# Patient Record
Sex: FEMALE | Race: BLACK OR AFRICAN AMERICAN | NOT HISPANIC OR LATINO | URBAN - METROPOLITAN AREA
[De-identification: names, ages, dates, MRNs, and addresses within clinical notes are randomized per-mention and may not be internally consistent; named-entity substitution may affect disease eponyms.]

---

## 2017-01-05 ENCOUNTER — INPATIENT (INPATIENT)
Facility: HOSPITAL | Age: 77
LOS: 3 days | Discharge: HOME | DRG: 304 | End: 2017-01-09
Attending: INTERNAL MEDICINE | Admitting: INTERNAL MEDICINE
Payer: MEDICARE

## 2017-01-05 VITALS
HEIGHT: 68 IN | WEIGHT: 125 LBS | HEART RATE: 85 BPM | RESPIRATION RATE: 16 BRPM | SYSTOLIC BLOOD PRESSURE: 201 MMHG | OXYGEN SATURATION: 96 % | TEMPERATURE: 98 F | DIASTOLIC BLOOD PRESSURE: 81 MMHG

## 2017-01-05 DIAGNOSIS — D64.9 ANEMIA, UNSPECIFIED: ICD-10-CM

## 2017-01-05 DIAGNOSIS — E78.5 HYPERLIPIDEMIA, UNSPECIFIED: ICD-10-CM

## 2017-01-05 DIAGNOSIS — Z29.9 ENCOUNTER FOR PROPHYLACTIC MEASURES, UNSPECIFIED: ICD-10-CM

## 2017-01-05 DIAGNOSIS — I63.9 CEREBRAL INFARCTION, UNSPECIFIED: ICD-10-CM

## 2017-01-05 DIAGNOSIS — E11.9 TYPE 2 DIABETES MELLITUS WITHOUT COMPLICATIONS: ICD-10-CM

## 2017-01-05 DIAGNOSIS — I16.0 HYPERTENSIVE URGENCY: ICD-10-CM

## 2017-01-05 DIAGNOSIS — Z93.1 GASTROSTOMY STATUS: Chronic | ICD-10-CM

## 2017-01-05 LAB
ALBUMIN SERPL ELPH-MCNC: 3 G/DL — LOW (ref 3.5–5)
ALP SERPL-CCNC: 89 U/L — SIGNIFICANT CHANGE UP (ref 40–120)
ALT FLD-CCNC: 31 U/L DA — SIGNIFICANT CHANGE UP (ref 10–60)
ANION GAP SERPL CALC-SCNC: 6 MMOL/L — SIGNIFICANT CHANGE UP (ref 5–17)
AST SERPL-CCNC: 32 U/L — SIGNIFICANT CHANGE UP (ref 10–40)
BASOPHILS # BLD AUTO: 0.1 K/UL — SIGNIFICANT CHANGE UP (ref 0–0.2)
BASOPHILS NFR BLD AUTO: 0.9 % — SIGNIFICANT CHANGE UP (ref 0–2)
BILIRUB SERPL-MCNC: 0.2 MG/DL — SIGNIFICANT CHANGE UP (ref 0.2–1.2)
BUN SERPL-MCNC: 42 MG/DL — HIGH (ref 7–18)
CALCIUM SERPL-MCNC: 9.6 MG/DL — SIGNIFICANT CHANGE UP (ref 8.4–10.5)
CHLORIDE SERPL-SCNC: 111 MMOL/L — HIGH (ref 96–108)
CO2 SERPL-SCNC: 29 MMOL/L — SIGNIFICANT CHANGE UP (ref 22–31)
CREAT SERPL-MCNC: 1.28 MG/DL — SIGNIFICANT CHANGE UP (ref 0.5–1.3)
EOSINOPHIL # BLD AUTO: 0.1 K/UL — SIGNIFICANT CHANGE UP (ref 0–0.5)
EOSINOPHIL NFR BLD AUTO: 1.6 % — SIGNIFICANT CHANGE UP (ref 0–6)
GLUCOSE SERPL-MCNC: 95 MG/DL — SIGNIFICANT CHANGE UP (ref 70–99)
HCT VFR BLD CALC: 31.3 % — LOW (ref 34.5–45)
HGB BLD-MCNC: 9.8 G/DL — LOW (ref 11.5–15.5)
LYMPHOCYTES # BLD AUTO: 1.5 K/UL — SIGNIFICANT CHANGE UP (ref 1–3.3)
LYMPHOCYTES # BLD AUTO: 20.8 % — SIGNIFICANT CHANGE UP (ref 13–44)
MCHC RBC-ENTMCNC: 23.6 PG — LOW (ref 27–34)
MCHC RBC-ENTMCNC: 31.4 GM/DL — LOW (ref 32–36)
MCV RBC AUTO: 75.2 FL — LOW (ref 80–100)
MONOCYTES # BLD AUTO: 0.6 K/UL — SIGNIFICANT CHANGE UP (ref 0–0.9)
MONOCYTES NFR BLD AUTO: 8.4 % — SIGNIFICANT CHANGE UP (ref 2–14)
NEUTROPHILS # BLD AUTO: 4.9 K/UL — SIGNIFICANT CHANGE UP (ref 1.8–7.4)
NEUTROPHILS NFR BLD AUTO: 68.3 % — SIGNIFICANT CHANGE UP (ref 43–77)
PLATELET # BLD AUTO: 327 K/UL — SIGNIFICANT CHANGE UP (ref 150–400)
POTASSIUM SERPL-MCNC: 4.2 MMOL/L — SIGNIFICANT CHANGE UP (ref 3.5–5.3)
POTASSIUM SERPL-SCNC: 4.2 MMOL/L — SIGNIFICANT CHANGE UP (ref 3.5–5.3)
PROT SERPL-MCNC: 7.4 G/DL — SIGNIFICANT CHANGE UP (ref 6–8.3)
RBC # BLD: 4.17 M/UL — SIGNIFICANT CHANGE UP (ref 3.8–5.2)
RBC # FLD: 20 % — HIGH (ref 10.3–14.5)
SODIUM SERPL-SCNC: 146 MMOL/L — HIGH (ref 135–145)
WBC # BLD: 7.1 K/UL — SIGNIFICANT CHANGE UP (ref 3.8–10.5)
WBC # FLD AUTO: 7.1 K/UL — SIGNIFICANT CHANGE UP (ref 3.8–10.5)

## 2017-01-05 PROCEDURE — 99285 EMERGENCY DEPT VISIT HI MDM: CPT

## 2017-01-05 RX ORDER — DEXTROSE 50 % IN WATER 50 %
25 SYRINGE (ML) INTRAVENOUS ONCE
Qty: 0 | Refills: 0 | Status: DISCONTINUED | OUTPATIENT
Start: 2017-01-05 | End: 2017-01-09

## 2017-01-05 RX ORDER — BACLOFEN 100 %
10 POWDER (GRAM) MISCELLANEOUS THREE TIMES A DAY
Qty: 0 | Refills: 0 | Status: DISCONTINUED | OUTPATIENT
Start: 2017-01-05 | End: 2017-01-09

## 2017-01-05 RX ORDER — CLOPIDOGREL BISULFATE 75 MG/1
75 TABLET, FILM COATED ORAL DAILY
Qty: 0 | Refills: 0 | Status: DISCONTINUED | OUTPATIENT
Start: 2017-01-05 | End: 2017-01-09

## 2017-01-05 RX ORDER — INSULIN LISPRO 100/ML
VIAL (ML) SUBCUTANEOUS
Qty: 0 | Refills: 0 | Status: DISCONTINUED | OUTPATIENT
Start: 2017-01-05 | End: 2017-01-09

## 2017-01-05 RX ORDER — HYDRALAZINE HCL 50 MG
10 TABLET ORAL ONCE
Qty: 0 | Refills: 0 | Status: COMPLETED | OUTPATIENT
Start: 2017-01-05 | End: 2017-01-05

## 2017-01-05 RX ORDER — ATORVASTATIN CALCIUM 80 MG/1
1 TABLET, FILM COATED ORAL
Qty: 0 | Refills: 0 | COMMUNITY

## 2017-01-05 RX ORDER — LISINOPRIL 2.5 MG/1
20 TABLET ORAL DAILY
Qty: 0 | Refills: 0 | Status: DISCONTINUED | OUTPATIENT
Start: 2017-01-05 | End: 2017-01-06

## 2017-01-05 RX ORDER — METOPROLOL TARTRATE 50 MG
100 TABLET ORAL
Qty: 0 | Refills: 0 | Status: DISCONTINUED | OUTPATIENT
Start: 2017-01-05 | End: 2017-01-09

## 2017-01-05 RX ORDER — SODIUM CHLORIDE 9 MG/ML
1000 INJECTION, SOLUTION INTRAVENOUS
Qty: 0 | Refills: 0 | Status: DISCONTINUED | OUTPATIENT
Start: 2017-01-05 | End: 2017-01-09

## 2017-01-05 RX ORDER — GLUCAGON INJECTION, SOLUTION 0.5 MG/.1ML
1 INJECTION, SOLUTION SUBCUTANEOUS ONCE
Qty: 0 | Refills: 0 | Status: DISCONTINUED | OUTPATIENT
Start: 2017-01-05 | End: 2017-01-09

## 2017-01-05 RX ORDER — DEXTROSE 50 % IN WATER 50 %
1 SYRINGE (ML) INTRAVENOUS ONCE
Qty: 0 | Refills: 0 | Status: DISCONTINUED | OUTPATIENT
Start: 2017-01-05 | End: 2017-01-09

## 2017-01-05 RX ORDER — DEXTROSE 50 % IN WATER 50 %
12.5 SYRINGE (ML) INTRAVENOUS ONCE
Qty: 0 | Refills: 0 | Status: DISCONTINUED | OUTPATIENT
Start: 2017-01-05 | End: 2017-01-09

## 2017-01-05 RX ORDER — ATORVASTATIN CALCIUM 80 MG/1
80 TABLET, FILM COATED ORAL AT BEDTIME
Qty: 0 | Refills: 0 | Status: DISCONTINUED | OUTPATIENT
Start: 2017-01-05 | End: 2017-01-09

## 2017-01-05 NOTE — H&P ADULT. - PROBLEM SELECTOR PLAN 4
c/w atorvastatin 80mg a day   f/u lipid profile pt takes metformin 1000mg BID and Glimepiride 2mg a day  c/w HSS for now   f/u HbA1C

## 2017-01-05 NOTE — H&P ADULT. - PROBLEM SELECTOR PROBLEM 4
Hyperlipidemia, unspecified hyperlipidemia type Type 2 diabetes mellitus without complication, without long-term current use of insulin

## 2017-01-05 NOTE — H&P ADULT. - PROBLEM SELECTOR PLAN 2
pt has hx of CVA with R residual hemiparesis   pt is bed bound and non verbal since   c/w ASA, statin pt has hx of CVA with R residual hemiparesis   pt is bed bound and non verbal since   c/w Plavix, statin

## 2017-01-05 NOTE — ED PROVIDER NOTE - OBJECTIVE STATEMENT
Pt was living in [place] with her daughter and suffered major CVA, went to rehab and was d/c from rehab yesterday. Family decided to move her to [facility] for better family support with multiple sons/daughters. Children picked her up from airport today and patient had no supplies, and no home care set up, although family was told that it had been. They were instructed to go to ED. Per family there have been no acute changes, but they have no way to provide care for their mother at home, no food, feeding pump, home services, training, etc.

## 2017-01-05 NOTE — ED ADULT NURSE NOTE - OBJECTIVE STATEMENT
pt a&ox3, BIBEMS to check feeding tube. pt with CVA in june 2016, right sided weakness. pt a&ox3, BIBEMS to check feeding tube. pt with CVA in june 2016, right sided weakness. pt with PEG tube placement mid december 2016. lived in [place] with daughter, pt moved to [facility] to live with children but has no supplies for peg tube feedings and no home care set up. seen by SW, to be admitted.

## 2017-01-05 NOTE — H&P ADULT. - PROBLEM SELECTOR PLAN 1
On admission pt presented with hypertensive urgency of 201/81  pt did not get medications in AM  will resume medications   c/w telemetry monitoring for 24hrs until BP improves   f/u TSH, lipid profile and HbA1C

## 2017-01-05 NOTE — ED ADULT NURSE NOTE - ED STAT RN HANDOFF DETAILS
report given to LORENZO Alonzo in G2 in stable condition for continuation of care. pt a&ox3, unable to speak. hx of CVA right sided weakness. 20G LAC. PEG tube.

## 2017-01-05 NOTE — H&P ADULT. - ATTENDING COMMENTS
Patient seen and examined after d/w LÓPEZ Vera this morning    75 y/o F s/p Acute CVA, Rehab stay and bedbound, non verbal s/p Peg brought in by family from [facility] to be cared for by other family members, family unable to care for ADL's and feeding with planned Home arrangements not set up brought to hospital. Patient in no acute distress, contracted unable to convey any needs or complaints. Patient with elevated BP on admission, still elevated but better controlled.    SH: Non smoker  FH: Not contributory to current presentation.    Vitals: /80 to 176/84  HR 78  Temp 98.2F SaO2 100%  RR 15    P/E: As above  Neuro: Alert, Awake, Non verbal, Right sided hemiparesis, Able to move left arm.   Legs: Contracted; No leg edema b/l LE    Labs: reviewed;   CXR:     D/D:   Inability to care for at home  Uncontrolled HTn  Anemia etiology unclear  s/p CVA with residual hemiparesis    Plan: Admit to tele overnight; D/C tele if BP better controlled;   Continue Metoprolol; Increased Hydralazine to 50mg q 8 hrs. Continue Plavix.  iron studies noted; No evidence of hemolysis; F/U B12, Vitamin D levels    Discussed with PGY2 LÓPEZ Vera this morning;   Discussed with  who spoke with daughter; Patient is pending Medicaid; Plan as per daughter, okay with Short term rehab if any ability to participate in therapy (doubt), if not okay with Skilled Nursing facility placement.   Discussed with floor PGY1 Dr. Larry. Patient seen and examined after d/w LÓPEZ Vera this morning    75 y/o F s/p Acute CVA, Rehab stay and bedbound, non verbal s/p Peg brought in by family from [facility] to be cared for by other family members, family unable to care for ADL's and feeding with planned Home arrangements not set up brought to hospital. Patient in no acute distress, contracted unable to convey any needs or complaints. Patient with elevated BP on admission, still elevated but better controlled.    SH: Non smoker  FH: Not contributory to current presentation.    Vitals: /80 to 176/84  HR 78  Temp 98.2F SaO2 100%  RR 15    P/E: As above  Neuro: Alert, Awake, Non verbal, Right sided hemiparesis, Able to move left arm.   Legs: Contracted; No leg edema b/l LE    Labs: reviewed;   CXR:     D/D:   Inability to care for at home  Uncontrolled HTn  Anemia etiology unclear  s/p CVA with residual hemiparesis    Plan: Admit to tele overnight; D/C tele if BP better controlled;   Continue Metoprolol; Increased Hydralazine to 50mg q 8 hrs. Increase Lisinopril to 40 mg daily; 20mg now;  Continue Plavix.  iron studies noted; No evidence of hemolysis; F/U B12, Vitamin D levels    Discussed with PGY2 LÓPEZ Vera this morning;   Discussed with  who spoke with daughter; Patient is pending Medicaid; Plan as per daughter, okay with Short term rehab if any ability to participate in therapy (doubt), if not okay with Skilled Nursing facility placement.   Discussed with floor PGY1 Dr. Larry.    Attending Addendum: 5PM  Discussed with son at bedside who brought in records from [facility] stay. reviewed meds; Patient had CVA in June 2016 discharged to rehab on Puree diet, developed dysphagia in December when peg tube was placed. Patient seen and examined after d/w LÓPEZ Vera this morning    77 y/o F s/p Acute CVA, Rehab stay and bedbound, non verbal s/p Peg brought in by family from [facility] to be cared for by other family members, family unable to care for ADL's and feeding with planned Home arrangements not set up brought to hospital. Patient in no acute distress, contracted unable to convey any needs or complaints. Patient with elevated BP on admission, still elevated but better controlled.    SH: Non smoker  FH: Not contributory to current presentation.    Vitals: /80 to 176/84  HR 78  Temp 98.2F SaO2 100%  RR 15    P/E: As above  Neuro: Alert, Awake, Non verbal, Right sided hemiparesis, Able to move left arm.   Legs: Contracted; No leg edema b/l LE    Labs: reviewed;   CXR:     D/D:   Inability to care for at home  Uncontrolled HTn  Anemia etiology unclear  s/p CVA with residual hemiparesis    Plan: Admit to tele overnight; D/C tele if BP better controlled;   Continue Metoprolol; Increased Hydralazine to 50mg q 8 hrs. Increase Lisinopril to 40 mg daily; 20mg now;  Continue Plavix.  iron studies noted; No evidence of hemolysis; F/U B12, Vitamin D levels    Discussed with PGY2 LÓPEZ Vera this morning;   Discussed with  who spoke with daughter; Patient is pending Medicaid; Plan as per daughter, okay with Short term rehab if any ability to participate in therapy (doubt), if not okay with Skilled Nursing facility placement.   Discussed with floor PGY1 Dr. Larry.    Attending Addendum: 5PM  Discussed with son at bedside who brought in records from [facility] stay. reviewed meds; Patient had CVA in June 2016 discharged to rehab on Puree diet, developed dysphagia in December when peg tube was placed.    Patient was om Metformin and Glimepiride. Will resume Metformin once sugars are more than 150 and tolerating diet.If still elevated, will place back on Glimepiride.

## 2017-01-05 NOTE — H&P ADULT. - HISTORY OF PRESENT ILLNESS
77 y/o F originally from [place] and bedbound and non verbal since. Pt has PMH of HTN, DM, HLD and CVA (with residual Right hemiparesis). Pt had recent CVA in [place] has discharged to rehab with feeding tube (due to decreased oral intake) and was discharged from rehab yesterday. Family decided to move her to [facility] for better family support with multiple sons/daughters. Children picked her up from airport today and patient had no supplies, and no home care set up, although family was told that it had been. They were instructed to go to ED. Per family there have been no acute changes, but they have no way to provide care for their mother at home, no food, feeding pump, home services, training, etc. Unable to asses ROS due to pt non verbal.    Social Hx: never smoker,

## 2017-01-05 NOTE — H&P ADULT. - GASTROINTESTINAL DETAILS
no organomegaly/no masses palpable/no bruit/soft/no rebound tenderness/nontender/normal/no distention/no rigidity/bowel sounds normal/no guarding

## 2017-01-05 NOTE — H&P ADULT. - PROBLEM SELECTOR PROBLEM 3
Type 2 diabetes mellitus without complication, without long-term current use of insulin Anemia, unspecified type

## 2017-01-05 NOTE — H&P ADULT. - ASSESSMENT
75 y/o F originally from [place] and bedbound and non verbal since. Pt has PMH of HTN, DM, HLD and CVA (with residual Right hemiparesis). Pt had recent CVA in [place] has discharged to rehab with feeding tube (due to decreased oral intake) and was discharged from rehab yesterday. Family decided to move her to [facility] for better family support with multiple sons/daughters, but unable to care of her at home due to on supplies at home.

## 2017-01-05 NOTE — H&P ADULT. - PROBLEM SELECTOR PLAN 3
pt takes metformin 1000mg BID and Glimepiride 2mg a day  c/w HSS for now   f/u HbA1C pt presented with microcytic anemia, possible 2/2 chronic disease   no evidence of blood in the stool   f/u anemia panel  f/u control CBC in AM

## 2017-01-05 NOTE — H&P ADULT. - PMH
CVA (cerebral vascular accident)    Essential hypertension    Hyperlipidemia, unspecified hyperlipidemia type    Type 2 diabetes mellitus without complication, without long-term current use of insulin

## 2017-01-06 LAB
ALBUMIN SERPL ELPH-MCNC: 3.1 G/DL — LOW (ref 3.5–5)
ALP SERPL-CCNC: 85 U/L — SIGNIFICANT CHANGE UP (ref 40–120)
ALT FLD-CCNC: 29 U/L DA — SIGNIFICANT CHANGE UP (ref 10–60)
ANION GAP SERPL CALC-SCNC: 7 MMOL/L — SIGNIFICANT CHANGE UP (ref 5–17)
AST SERPL-CCNC: 28 U/L — SIGNIFICANT CHANGE UP (ref 10–40)
BASOPHILS # BLD AUTO: 0 K/UL — SIGNIFICANT CHANGE UP (ref 0–0.2)
BASOPHILS NFR BLD AUTO: 0.5 % — SIGNIFICANT CHANGE UP (ref 0–2)
BILIRUB SERPL-MCNC: 0.4 MG/DL — SIGNIFICANT CHANGE UP (ref 0.2–1.2)
BUN SERPL-MCNC: 39 MG/DL — HIGH (ref 7–18)
CALCIUM SERPL-MCNC: 9.6 MG/DL — SIGNIFICANT CHANGE UP (ref 8.4–10.5)
CHLORIDE SERPL-SCNC: 110 MMOL/L — HIGH (ref 96–108)
CK MB BLD-MCNC: <1.6 % — SIGNIFICANT CHANGE UP (ref 0–3.5)
CK MB BLD-MCNC: <1.9 % — SIGNIFICANT CHANGE UP (ref 0–3.5)
CK MB CFR SERPL CALC: <1 NG/ML — SIGNIFICANT CHANGE UP (ref 0–3.6)
CK MB CFR SERPL CALC: <1 NG/ML — SIGNIFICANT CHANGE UP (ref 0–3.6)
CK SERPL-CCNC: 52 U/L — SIGNIFICANT CHANGE UP (ref 21–215)
CK SERPL-CCNC: 64 U/L — SIGNIFICANT CHANGE UP (ref 21–215)
CO2 SERPL-SCNC: 29 MMOL/L — SIGNIFICANT CHANGE UP (ref 22–31)
CREAT SERPL-MCNC: 1.25 MG/DL — SIGNIFICANT CHANGE UP (ref 0.5–1.3)
EOSINOPHIL # BLD AUTO: 0.1 K/UL — SIGNIFICANT CHANGE UP (ref 0–0.5)
EOSINOPHIL NFR BLD AUTO: 1.9 % — SIGNIFICANT CHANGE UP (ref 0–6)
FERRITIN SERPL-MCNC: 413.7 NG/ML — HIGH (ref 15–150)
FOLATE SERPL-MCNC: 19.5 NG/ML — SIGNIFICANT CHANGE UP (ref 4.8–24.2)
GLUCOSE SERPL-MCNC: 113 MG/DL — HIGH (ref 70–99)
HAPTOGLOB SERPL-MCNC: 370 MG/DL — HIGH (ref 34–200)
HBA1C BLD-MCNC: 5.6 % — SIGNIFICANT CHANGE UP (ref 4–5.6)
HCT VFR BLD CALC: 29.9 % — LOW (ref 34.5–45)
HGB BLD-MCNC: 9.2 G/DL — LOW (ref 11.5–15.5)
IRON SATN MFR SERPL: 27 % — SIGNIFICANT CHANGE UP (ref 15–50)
IRON SATN MFR SERPL: 61 UG/DL — SIGNIFICANT CHANGE UP (ref 40–150)
LDH SERPL L TO P-CCNC: 270 U/L — HIGH (ref 120–225)
LYMPHOCYTES # BLD AUTO: 1.4 K/UL — SIGNIFICANT CHANGE UP (ref 1–3.3)
LYMPHOCYTES # BLD AUTO: 19.1 % — SIGNIFICANT CHANGE UP (ref 13–44)
MAGNESIUM SERPL-MCNC: 2.1 MG/DL — SIGNIFICANT CHANGE UP (ref 1.8–2.4)
MCHC RBC-ENTMCNC: 23.4 PG — LOW (ref 27–34)
MCHC RBC-ENTMCNC: 30.6 GM/DL — LOW (ref 32–36)
MCV RBC AUTO: 76.3 FL — LOW (ref 80–100)
MONOCYTES # BLD AUTO: 0.6 K/UL — SIGNIFICANT CHANGE UP (ref 0–0.9)
MONOCYTES NFR BLD AUTO: 7.4 % — SIGNIFICANT CHANGE UP (ref 2–14)
NEUTROPHILS # BLD AUTO: 5.4 K/UL — SIGNIFICANT CHANGE UP (ref 1.8–7.4)
NEUTROPHILS NFR BLD AUTO: 71.2 % — SIGNIFICANT CHANGE UP (ref 43–77)
PHOSPHATE SERPL-MCNC: 1.2 MG/DL — LOW (ref 2.5–4.5)
PLATELET # BLD AUTO: 323 K/UL — SIGNIFICANT CHANGE UP (ref 150–400)
POTASSIUM SERPL-MCNC: 3.8 MMOL/L — SIGNIFICANT CHANGE UP (ref 3.5–5.3)
POTASSIUM SERPL-SCNC: 3.8 MMOL/L — SIGNIFICANT CHANGE UP (ref 3.5–5.3)
PROT SERPL-MCNC: 7.2 G/DL — SIGNIFICANT CHANGE UP (ref 6–8.3)
RBC # BLD: 3.91 M/UL — SIGNIFICANT CHANGE UP (ref 3.8–5.2)
RBC # BLD: 3.92 M/UL — SIGNIFICANT CHANGE UP (ref 3.8–5.2)
RBC # FLD: 19.9 % — HIGH (ref 10.3–14.5)
RETICS #: 127.5 K/UL — HIGH (ref 25–125)
RETICS/RBC NFR: 3.3 % — HIGH (ref 0.5–2.5)
SODIUM SERPL-SCNC: 146 MMOL/L — HIGH (ref 135–145)
TIBC SERPL-MCNC: 225 UG/DL — LOW (ref 250–450)
TROPONIN I SERPL-MCNC: 0.02 NG/ML — SIGNIFICANT CHANGE UP (ref 0–0.04)
TROPONIN I SERPL-MCNC: 0.05 NG/ML — HIGH (ref 0–0.04)
UIBC SERPL-MCNC: 164 UG/DL — SIGNIFICANT CHANGE UP (ref 110–370)
VIT B12 SERPL-MCNC: 475 PG/ML — SIGNIFICANT CHANGE UP (ref 243–894)
WBC # BLD: 7.6 K/UL — SIGNIFICANT CHANGE UP (ref 3.8–10.5)
WBC # FLD AUTO: 7.6 K/UL — SIGNIFICANT CHANGE UP (ref 3.8–10.5)

## 2017-01-06 PROCEDURE — 99223 1ST HOSP IP/OBS HIGH 75: CPT | Mod: AI,GC

## 2017-01-06 RX ORDER — SODIUM CHLORIDE 9 MG/ML
1000 INJECTION, SOLUTION INTRAVENOUS
Qty: 0 | Refills: 0 | Status: DISCONTINUED | OUTPATIENT
Start: 2017-01-06 | End: 2017-01-06

## 2017-01-06 RX ORDER — POTASSIUM PHOSPHATE, MONOBASIC POTASSIUM PHOSPHATE, DIBASIC 236; 224 MG/ML; MG/ML
15 INJECTION, SOLUTION INTRAVENOUS ONCE
Qty: 0 | Refills: 0 | Status: COMPLETED | OUTPATIENT
Start: 2017-01-06 | End: 2017-01-06

## 2017-01-06 RX ORDER — HYDRALAZINE HCL 50 MG
25 TABLET ORAL ONCE
Qty: 0 | Refills: 0 | Status: COMPLETED | OUTPATIENT
Start: 2017-01-06 | End: 2017-01-06

## 2017-01-06 RX ORDER — HYDRALAZINE HCL 50 MG
50 TABLET ORAL EVERY 8 HOURS
Qty: 0 | Refills: 0 | Status: DISCONTINUED | OUTPATIENT
Start: 2017-01-06 | End: 2017-01-09

## 2017-01-06 RX ORDER — LISINOPRIL 2.5 MG/1
20 TABLET ORAL ONCE
Qty: 0 | Refills: 0 | Status: COMPLETED | OUTPATIENT
Start: 2017-01-06 | End: 2017-01-06

## 2017-01-06 RX ORDER — LISINOPRIL 2.5 MG/1
40 TABLET ORAL DAILY
Qty: 0 | Refills: 0 | Status: DISCONTINUED | OUTPATIENT
Start: 2017-01-07 | End: 2017-01-09

## 2017-01-06 RX ORDER — ENOXAPARIN SODIUM 100 MG/ML
40 INJECTION SUBCUTANEOUS DAILY
Qty: 0 | Refills: 0 | Status: DISCONTINUED | OUTPATIENT
Start: 2017-01-06 | End: 2017-01-09

## 2017-01-06 RX ADMIN — SODIUM CHLORIDE 60 MILLILITER(S): 9 INJECTION, SOLUTION INTRAVENOUS at 06:44

## 2017-01-06 RX ADMIN — SODIUM CHLORIDE 60 MILLILITER(S): 9 INJECTION, SOLUTION INTRAVENOUS at 03:59

## 2017-01-06 RX ADMIN — POTASSIUM PHOSPHATE, MONOBASIC POTASSIUM PHOSPHATE, DIBASIC 62.5 MILLIMOLE(S): 236; 224 INJECTION, SOLUTION INTRAVENOUS at 11:50

## 2017-01-06 RX ADMIN — CLOPIDOGREL BISULFATE 75 MILLIGRAM(S): 75 TABLET, FILM COATED ORAL at 11:50

## 2017-01-06 RX ADMIN — Medication 10 MILLIGRAM(S): at 00:02

## 2017-01-06 RX ADMIN — LISINOPRIL 20 MILLIGRAM(S): 2.5 TABLET ORAL at 18:07

## 2017-01-06 RX ADMIN — Medication 25 MILLIGRAM(S): at 10:32

## 2017-01-06 RX ADMIN — ATORVASTATIN CALCIUM 80 MILLIGRAM(S): 80 TABLET, FILM COATED ORAL at 23:01

## 2017-01-06 RX ADMIN — Medication 50 MILLIGRAM(S): at 23:01

## 2017-01-06 RX ADMIN — Medication 50 MILLIGRAM(S): at 18:06

## 2017-01-06 RX ADMIN — Medication 100 MILLIGRAM(S): at 00:02

## 2017-01-06 RX ADMIN — Medication 100 MILLIGRAM(S): at 18:07

## 2017-01-06 RX ADMIN — Medication 100 MILLIGRAM(S): at 06:39

## 2017-01-06 RX ADMIN — LISINOPRIL 20 MILLIGRAM(S): 2.5 TABLET ORAL at 06:39

## 2017-01-06 NOTE — DIETITIAN INITIAL EVALUATION ADULT. - MD RECOMMEND
Increase glucerna 1.2 to new goal rate of 75ml/hx16(1200ml,1440kcal,72g protein, 966ml free water), MD to monitor/assess fluid status as needed.

## 2017-01-06 NOTE — DIETITIAN INITIAL EVALUATION ADULT. - DIET TYPE
NPO with tube feedings/Glucerna 1.2 at 20x16, goal 30x16(480ml,576kcal,29g protein, 386ml free water)

## 2017-01-06 NOTE — DIETITIAN INITIAL EVALUATION ADULT. - PROBLEM SELECTOR PLAN 2
pt has hx of CVA with R residual hemiparesis   pt is bed bound and non verbal since   c/w Plavix, statin

## 2017-01-06 NOTE — DIETITIAN INITIAL EVALUATION ADULT. - PROBLEM SELECTOR PLAN 3
pt presented with microcytic anemia, possible 2/2 chronic disease   no evidence of blood in the stool   f/u anemia panel  f/u control CBC in AM

## 2017-01-07 LAB
24R-OH-CALCIDIOL SERPL-MCNC: 19.9 NG/ML — LOW (ref 30–100)
ALBUMIN SERPL ELPH-MCNC: 3 G/DL — LOW (ref 3.5–5)
ALP SERPL-CCNC: 87 U/L — SIGNIFICANT CHANGE UP (ref 40–120)
ALT FLD-CCNC: 31 U/L DA — SIGNIFICANT CHANGE UP (ref 10–60)
ANION GAP SERPL CALC-SCNC: 9 MMOL/L — SIGNIFICANT CHANGE UP (ref 5–17)
AST SERPL-CCNC: 26 U/L — SIGNIFICANT CHANGE UP (ref 10–40)
BASOPHILS # BLD AUTO: 0.1 K/UL — SIGNIFICANT CHANGE UP (ref 0–0.2)
BASOPHILS NFR BLD AUTO: 0.9 % — SIGNIFICANT CHANGE UP (ref 0–2)
BILIRUB SERPL-MCNC: 0.5 MG/DL — SIGNIFICANT CHANGE UP (ref 0.2–1.2)
BUN SERPL-MCNC: 30 MG/DL — HIGH (ref 7–18)
CALCIUM SERPL-MCNC: 9.1 MG/DL — SIGNIFICANT CHANGE UP (ref 8.4–10.5)
CHLORIDE SERPL-SCNC: 108 MMOL/L — SIGNIFICANT CHANGE UP (ref 96–108)
CO2 SERPL-SCNC: 27 MMOL/L — SIGNIFICANT CHANGE UP (ref 22–31)
CREAT SERPL-MCNC: 1.24 MG/DL — SIGNIFICANT CHANGE UP (ref 0.5–1.3)
EOSINOPHIL # BLD AUTO: 0.1 K/UL — SIGNIFICANT CHANGE UP (ref 0–0.5)
EOSINOPHIL NFR BLD AUTO: 1.7 % — SIGNIFICANT CHANGE UP (ref 0–6)
FOLATE SERPL-MCNC: 16.1 NG/ML — SIGNIFICANT CHANGE UP (ref 4.8–24.2)
GLUCOSE SERPL-MCNC: 98 MG/DL — SIGNIFICANT CHANGE UP (ref 70–99)
HBA1C BLD-MCNC: 5.7 % — HIGH (ref 4–5.6)
HCT VFR BLD CALC: 29.7 % — LOW (ref 34.5–45)
HGB BLD-MCNC: 9 G/DL — LOW (ref 11.5–15.5)
LYMPHOCYTES # BLD AUTO: 1.9 K/UL — SIGNIFICANT CHANGE UP (ref 1–3.3)
LYMPHOCYTES # BLD AUTO: 29 % — SIGNIFICANT CHANGE UP (ref 13–44)
MAGNESIUM SERPL-MCNC: 1.9 MG/DL — SIGNIFICANT CHANGE UP (ref 1.8–2.4)
MCHC RBC-ENTMCNC: 22.8 PG — LOW (ref 27–34)
MCHC RBC-ENTMCNC: 30.4 GM/DL — LOW (ref 32–36)
MCV RBC AUTO: 74.9 FL — LOW (ref 80–100)
MONOCYTES # BLD AUTO: 0.5 K/UL — SIGNIFICANT CHANGE UP (ref 0–0.9)
MONOCYTES NFR BLD AUTO: 7.8 % — SIGNIFICANT CHANGE UP (ref 2–14)
NEUTROPHILS # BLD AUTO: 4 K/UL — SIGNIFICANT CHANGE UP (ref 1.8–7.4)
NEUTROPHILS NFR BLD AUTO: 60.6 % — SIGNIFICANT CHANGE UP (ref 43–77)
PHOSPHATE SERPL-MCNC: 2.6 MG/DL — SIGNIFICANT CHANGE UP (ref 2.5–4.5)
PLATELET # BLD AUTO: 308 K/UL — SIGNIFICANT CHANGE UP (ref 150–400)
POTASSIUM SERPL-MCNC: 3.5 MMOL/L — SIGNIFICANT CHANGE UP (ref 3.5–5.3)
POTASSIUM SERPL-SCNC: 3.5 MMOL/L — SIGNIFICANT CHANGE UP (ref 3.5–5.3)
PROT SERPL-MCNC: 7 G/DL — SIGNIFICANT CHANGE UP (ref 6–8.3)
RBC # BLD: 3.96 M/UL — SIGNIFICANT CHANGE UP (ref 3.8–5.2)
RBC # FLD: 19 % — HIGH (ref 10.3–14.5)
SODIUM SERPL-SCNC: 144 MMOL/L — SIGNIFICANT CHANGE UP (ref 135–145)
TSH SERPL-MCNC: 1.64 UU/ML — SIGNIFICANT CHANGE UP (ref 0.34–4.82)
VIT D25+D1,25 OH+D1,25 PNL SERPL-MCNC: 37.3 PG/ML — SIGNIFICANT CHANGE UP (ref 19.9–79.3)
WBC # BLD: 6.6 K/UL — SIGNIFICANT CHANGE UP (ref 3.8–10.5)
WBC # FLD AUTO: 6.6 K/UL — SIGNIFICANT CHANGE UP (ref 3.8–10.5)

## 2017-01-07 PROCEDURE — 99232 SBSQ HOSP IP/OBS MODERATE 35: CPT

## 2017-01-07 RX ORDER — IRON SUCROSE 20 MG/ML
100 INJECTION, SOLUTION INTRAVENOUS DAILY
Qty: 0 | Refills: 0 | Status: DISCONTINUED | OUTPATIENT
Start: 2017-01-07 | End: 2017-01-09

## 2017-01-07 RX ADMIN — Medication 50 MILLIGRAM(S): at 06:12

## 2017-01-07 RX ADMIN — IRON SUCROSE 210 MILLIGRAM(S): 20 INJECTION, SOLUTION INTRAVENOUS at 18:28

## 2017-01-07 RX ADMIN — ENOXAPARIN SODIUM 40 MILLIGRAM(S): 100 INJECTION SUBCUTANEOUS at 12:21

## 2017-01-07 RX ADMIN — ATORVASTATIN CALCIUM 80 MILLIGRAM(S): 80 TABLET, FILM COATED ORAL at 22:40

## 2017-01-07 RX ADMIN — Medication 100 MILLIGRAM(S): at 06:11

## 2017-01-07 RX ADMIN — Medication 100 MILLIGRAM(S): at 18:28

## 2017-01-07 RX ADMIN — Medication 1: at 18:28

## 2017-01-07 RX ADMIN — Medication 50 MILLIGRAM(S): at 22:40

## 2017-01-07 RX ADMIN — Medication 50 MILLIGRAM(S): at 13:29

## 2017-01-07 RX ADMIN — LISINOPRIL 40 MILLIGRAM(S): 2.5 TABLET ORAL at 06:12

## 2017-01-07 RX ADMIN — CLOPIDOGREL BISULFATE 75 MILLIGRAM(S): 75 TABLET, FILM COATED ORAL at 12:21

## 2017-01-08 PROCEDURE — 99232 SBSQ HOSP IP/OBS MODERATE 35: CPT | Mod: GC

## 2017-01-08 RX ORDER — DOCUSATE SODIUM 100 MG
100 CAPSULE ORAL
Qty: 0 | Refills: 0 | Status: DISCONTINUED | OUTPATIENT
Start: 2017-01-08 | End: 2017-01-09

## 2017-01-08 RX ORDER — SENNA PLUS 8.6 MG/1
2 TABLET ORAL AT BEDTIME
Qty: 0 | Refills: 0 | Status: DISCONTINUED | OUTPATIENT
Start: 2017-01-08 | End: 2017-01-09

## 2017-01-08 RX ORDER — CHOLECALCIFEROL (VITAMIN D3) 125 MCG
1000 CAPSULE ORAL DAILY
Qty: 0 | Refills: 0 | Status: DISCONTINUED | OUTPATIENT
Start: 2017-01-08 | End: 2017-01-09

## 2017-01-08 RX ORDER — CHOLECALCIFEROL (VITAMIN D3) 125 MCG
1000 CAPSULE ORAL DAILY
Qty: 0 | Refills: 0 | Status: DISCONTINUED | OUTPATIENT
Start: 2017-01-08 | End: 2017-01-08

## 2017-01-08 RX ORDER — DOCUSATE SODIUM 100 MG
100 CAPSULE ORAL
Qty: 0 | Refills: 0 | Status: DISCONTINUED | OUTPATIENT
Start: 2017-01-08 | End: 2017-01-08

## 2017-01-08 RX ADMIN — Medication 1000 UNIT(S): at 12:55

## 2017-01-08 RX ADMIN — ENOXAPARIN SODIUM 40 MILLIGRAM(S): 100 INJECTION SUBCUTANEOUS at 12:55

## 2017-01-08 RX ADMIN — Medication 100 MILLIGRAM(S): at 18:18

## 2017-01-08 RX ADMIN — Medication 50 MILLIGRAM(S): at 18:17

## 2017-01-08 RX ADMIN — Medication 1000 UNIT(S): at 22:16

## 2017-01-08 RX ADMIN — Medication 50 MILLIGRAM(S): at 05:57

## 2017-01-08 RX ADMIN — ATORVASTATIN CALCIUM 80 MILLIGRAM(S): 80 TABLET, FILM COATED ORAL at 22:16

## 2017-01-08 RX ADMIN — Medication 1: at 18:17

## 2017-01-08 RX ADMIN — Medication 50 MILLIGRAM(S): at 22:16

## 2017-01-08 RX ADMIN — CLOPIDOGREL BISULFATE 75 MILLIGRAM(S): 75 TABLET, FILM COATED ORAL at 12:55

## 2017-01-08 RX ADMIN — Medication 100 MILLIGRAM(S): at 05:57

## 2017-01-08 RX ADMIN — SENNA PLUS 2 TABLET(S): 8.6 TABLET ORAL at 22:16

## 2017-01-08 RX ADMIN — LISINOPRIL 40 MILLIGRAM(S): 2.5 TABLET ORAL at 05:57

## 2017-01-08 RX ADMIN — IRON SUCROSE 210 MILLIGRAM(S): 20 INJECTION, SOLUTION INTRAVENOUS at 12:55

## 2017-01-08 NOTE — PHYSICAL THERAPY INITIAL EVALUATION ADULT - PLANNED THERAPY INTERVENTIONS, PT EVAL
balance training/stretching/strengthening/bed mobility training/gait training/ROM/neuromuscular re-education/transfer training

## 2017-01-08 NOTE — PHYSICAL THERAPY INITIAL EVALUATION ADULT - DIAGNOSIS, PT EVAL
Difficulty in walking secondary to decreased strength, decreased ROM, decreased balance and decreased endurance.

## 2017-01-08 NOTE — DISCHARGE NOTE ADULT - MEDICATION SUMMARY - MEDICATIONS TO TAKE
I will START or STAY ON the medications listed below when I get home from the hospital:    lisinopril 40 mg oral tablet  -- 1 tab(s) by mouth once a day  -- Indication: For Hypertensive urgency    metFORMIN 1000 mg oral tablet  -- 1 tab(s) by mouth 2 times a day  -- Indication: For Type 2 diabetes mellitus without complication, without long-term current use of insulin    glimepiride 2 mg oral tablet  -- 1 tab(s) by mouth once a day  -- Indication: For Type 2 diabetes mellitus without complication, without long-term current use of insulin    atorvastatin 80 mg oral tablet  -- 1 tab(s) by mouth once a day (at bedtime)  -- Indication: For Hyperlipidemia, unspecified hyperlipidemia type    clopidogrel 75 mg oral tablet  -- 1 tab(s) by mouth once a day  -- Indication: For CVA (cerebral vascular accident)    metoprolol tartrate 100 mg oral tablet  -- 1 tab(s) by mouth 2 times a day  -- Indication: For Hypertensive urgency    iron sucrose 20 mg/mL intravenous solution  -- 5 milliliter(s) intravenous once a day  -- Indication: For Anemia, unspecified type    docusate sodium 100 mg oral capsule  -- 1 cap(s) by mouth 2 times a day  -- Indication: For Bowel Regimen    senna oral tablet  -- 2 tab(s) by mouth once a day (at bedtime)  -- Indication: For Bowel Regimen    baclofen 10 mg oral tablet  -- 1 tab(s) by mouth 3 times a day, As needed, muscle spasms  -- Indication: For Muscle Spasm    hydrALAZINE 50 mg oral tablet  -- 1 tab(s) by mouth every 8 hours  -- Indication: For Hypertensive urgency    cholecalciferol oral tablet  -- 1000 unit(s) by mouth once a day  -- Indication: For Vitamin D deficiency I will START or STAY ON the medications listed below when I get home from the hospital:    lisinopril 40 mg oral tablet  -- 1 tab(s) by mouth once a day  -- Indication: For Hypertensive urgency    metFORMIN 500 mg oral tablet  -- 1 tab(s) by mouth 2 times a day  -- Indication: For Type 2 diabetes mellitus without complication, without long-term current use of insulin    atorvastatin 80 mg oral tablet  -- 1 tab(s) by mouth once a day (at bedtime)  -- Indication: For Hyperlipidemia, unspecified hyperlipidemia type    clopidogrel 75 mg oral tablet  -- 1 tab(s) by mouth once a day  -- Indication: For CVA (cerebral vascular accident)    metoprolol tartrate 100 mg oral tablet  -- 1 tab(s) by mouth 2 times a day  -- Indication: For Hypertensive urgency    iron sucrose 20 mg/mL intravenous solution  -- 5 milliliter(s) intravenous once a day  -- Indication: For Anemia, unspecified type    docusate sodium 100 mg oral capsule  -- 1 cap(s) by mouth 2 times a day  -- Indication: For Bowel Regimen    senna oral tablet  -- 2 tab(s) by mouth once a day (at bedtime)  -- Indication: For Bowel Regimen    baclofen 10 mg oral tablet  -- 1 tab(s) by mouth 3 times a day, As needed, muscle spasms  -- Indication: For Muscle Spasm    hydrALAZINE 50 mg oral tablet  -- 1 tab(s) by mouth every 8 hours  -- Indication: For Hypertensive urgency    cholecalciferol oral tablet  -- 1000 unit(s) by mouth once a day  -- Indication: For Vitamin D deficiency I will START or STAY ON the medications listed below when I get home from the hospital:    lisinopril 40 mg oral tablet  -- 1 tab(s) by mouth once a day  -- Indication: For Hypertensive urgency    metFORMIN 500 mg oral tablet  -- 1 tab(s) by mouth 2 times a day  -- Indication: For Type 2 diabetes mellitus without complication, without long-term current use of insulin    atorvastatin 80 mg oral tablet  -- 1 tab(s) by mouth once a day (at bedtime)  -- Indication: For Hyperlipidemia, unspecified hyperlipidemia type    clopidogrel 75 mg oral tablet  -- 1 tab(s) by mouth once a day  -- Indication: For CVA (cerebral vascular accident)    metoprolol tartrate 100 mg oral tablet  -- 1 tab(s) by mouth 2 times a day  -- Indication: For Hypertensive urgency    ferrous sulfate 90 mg/5 mL oral syrup  -- 5 milliliter(s) by mouth once a day  -- Indication: For Iron supplement    docusate sodium 100 mg oral capsule  -- 1 cap(s) by mouth 2 times a day  -- Indication: For Constipation    senna oral tablet  -- 2 tab(s) by mouth once a day (at bedtime)  -- Indication: For Constipation    baclofen 10 mg oral tablet  -- 1 tab(s) by mouth 3 times a day, As needed, muscle spasms  -- Indication: For Muscle Spasm    hydrALAZINE 50 mg oral tablet  -- 1 tab(s) by mouth every 8 hours  -- Indication: For Hypertensive urgency    cholecalciferol oral tablet  -- 1000 unit(s) by mouth once a day  -- Indication: For Vitamin D deficiency

## 2017-01-08 NOTE — PHYSICAL THERAPY INITIAL EVALUATION ADULT - ADDITIONAL COMMENTS
As per son, Patient previously lived in [place] with daughter following a stroke two years ago.  Patient transferred OOB with assistance of one and ambulated only a few steps with hand held assistance.  After recent CVA however patient requires more assistance with transfers and has not ambulated.  Son moved mother to [place] to receive care and further rehabilitation. Son plans to eventually have Mother live with him following rehab but with 24 hour services if applicable.

## 2017-01-08 NOTE — DISCHARGE NOTE ADULT - HOSPITAL COURSE
77 y/o F originally from [place] and bedbound and non verbal since. Pt has PMH of HTN, DM, HLD and CVA (with residual Right hemiparesis). Per family, patient had history of stroke in the past, and recently had 2 further strokes. In last admission in december, charged to rehab with feeding tube placed (due to decreased oral intake). Recently discharged from rehab. Family decided to move her to [facility] for better family support with multiple sons/daughters. Children picked her up from airport today and patient had no supplies, and no home care set up, although family was told that it had been. They were instructed to go to ED. Per family there have been no acute changes, but they have no way to provide care for their mother at home, no food, feeding pump, home services, training, etc. Unable to asses ROS due to pt non verbal.  		  In the ED, found to be HTN to 201/81. Admitted to for Hypertensive Urgency.    #HTN Urgency: Admission pt presented with hypertensive urgency of 201/81. Unclear if patient had been receiving BP meds. Restarted on home metoprolol 100mg BID, and lisinopril 20mg. Lisinopril increased to 40mg QD and hydralazine added at dose 50mg q8H. BP stabilized to ABP 140s 150s. Monitored on telemetry without event. HbA1c  = 5.7.   -If HTN not controlled to <150/90, then consider increasing hydralazine to 100mg q8H.    #Cerebrovascular accident: pt has hx of CVA with R residual hemiparesis and recent CVA x 2. Pt is bed bound and non verbal since. Continued on ASA and plavix. Patient continued on PEG feedings. Patient found to be at neurologic baseline.    #Anemia: pt presented with microcytic anemia. H/H 9.8/31.3. MCV 75.2. Reticulocyte% 3.3. Absolute Reticulocyte 127.5. Started on IV Venofer.  Fecal Occult -ve.     #DM: pt takes metformin 1000mg BID and Glimepiride 2mg a day. It was held during admission and continued on sliding scal insulin. HbA1C.  = 5.7    #Vit D Deficiency:  19.9.  -PO repletion 1000 U QD    #PPx: SubQ Lovenox    #Dispo: pending PT 77 y/o F originally from [place] and bedbound and non verbal since. Pt has PMH of HTN, DM, HLD and CVA (with residual Right hemiparesis). Per family, patient had history of stroke in the past, and recently had 2 further strokes. In last admission in december, charged to rehab with feeding tube placed (due to decreased oral intake). Recently discharged from rehab. Family decided to move her to [facility] for better family support with multiple sons/daughters. Children picked her up from airport today and patient had no supplies, and no home care set up, although family was told that it had been. They were instructed to go to ED. Per family there have been no acute changes, but they have no way to provide care for their mother at home, no food, feeding pump, home services, training, etc. Unable to asses ROS due to pt non verbal.  		  In the ED, found to be HTN to 201/81. Admitted to for Hypertensive Urgency.    #HTN Urgency: Admission pt presented with hypertensive urgency of 201/81. Unclear if patient had been receiving BP meds. Restarted on home metoprolol 100mg BID, and lisinopril 20mg. Lisinopril increased to 40mg QD and hydralazine added at dose 50mg q8H. BP stabilized to ABP 140s 150s. Monitored on telemetry without event. HbA1c  = 5.7.   -If HTN not controlled to <150/90, then consider increasing hydralazine to 100mg q8H.    #Cerebrovascular accident: pt has hx of CVA with R residual hemiparesis and recent CVA x 2. Pt is bed bound and non verbal since. Continued on ASA and plavix. Patient continued on PEG feedings. Patient found to be at neurologic baseline.  -C/w ASA, Plavix, and PEG feeding.    #Anemia: pt presented with microcytic anemia. H/H 9.8/31.3. MCV 75.2. Reticulocyte% 3.3. Absolute Reticulocyte 127.5. Started on IV Venofer.  Fecal Occult -ve.   -Monitor CBC  -C/w IV Venofer.    #DM: pt takes metformin 1000mg BID and Glimepiride 2mg a day. It was held during admission and continued on sliding scale insulin. HbA1C.  = 5.7.  -C/w home metformin and glimiperide upon discharge.    #Vit D Deficiency:  19.9. Patient started on Vitamin D repletion with 1000U QD.  -PO repletion 1000 U QD  -Check repeat Vitamin D level in 4-6 weeks.    Per PT, patient to got to [facility].    Discussed with medical attending Dr. Reyez, patient medically stable for discharge to [facility]. 77 y/o F originally from [place] and bedbound and non verbal since. Pt has PMH of HTN, DM, HLD and CVA (with residual Right hemiparesis). Per family, patient had history of stroke in the past, and recently had 2 further strokes. In last admission in december, charged to rehab with feeding tube placed (due to decreased oral intake). Recently discharged from rehab. Family decided to move her to [facility] for better family support with multiple sons/daughters. Children picked her up from airport today and patient had no supplies, and no home care set up, although family was told that it had been. They were instructed to go to ED. Per family there have been no acute changes, but they have no way to provide care for their mother at home, no food, feeding pump, home services, training, etc. Unable to asses ROS due to pt non verbal.  		  In the ED, found to be HTN to 201/81. Admitted to for Hypertensive Urgency.    #HTN Urgency: Admission pt presented with hypertensive urgency of 201/81. Unclear if patient had been receiving BP meds. Restarted on home metoprolol 100mg BID, and lisinopril 20mg. Lisinopril increased to 40mg QD and hydralazine added at dose 50mg q8H. BP stabilized to ABP 140s 150s. Monitored on telemetry without event. HbA1c  = 5.7.   -If HTN not controlled to <150/90, then consider increasing hydralazine to 100mg q8H.    #Cerebrovascular accident: pt has hx of CVA with R residual hemiparesis and recent CVA x 2. Pt is bed bound and non verbal since. Continued on ASA and plavix. Patient continued on PEG feedings. Patient found to be at neurologic baseline.  -C/w ASA, Plavix, and PEG feeding.    #Anemia: pt presented with microcytic anemia. H/H 9.8/31.3. MCV 75.2. Reticulocyte% 3.3. Absolute Reticulocyte 127.5. Started on IV Venofer.  Fecal Occult -ve.  Haptologbin  370. Vitamin B12  475. Fe 61.TIBC  225. Fe Sat 27. . Ferrtin 413.  -Monitor CBC  -C/w IV Venofer.    #DM: pt takes metformin 1000mg BID and Glimepiride 2mg a day. It was held during admission and continued on sliding scale insulin. HbA1C.  = 5.7.  -C/w home metformin and glimiperide upon discharge.    #Vit D Deficiency:  19.9. Patient started on Vitamin D repletion with 1000U QD.  -PO repletion 1000 U QD  -Check repeat Vitamin D level in 4-6 weeks.    Per PT, patient to got to [facility].    Discussed with medical attending Dr. Reyez, patient medically stable for discharge to [facility]. 75 y/o F originally from [place] and bedbound and non verbal since. Pt has PMH of HTN, DM, HLD and CVA (with residual Right hemiparesis). Per family, patient had history of stroke in the past, and recently had 2 further strokes. In last admission in december, charged to rehab with feeding tube placed (due to decreased oral intake). Recently discharged from rehab. [facility] with multiple sons/daughters. Children picked her up from airport today and patient had no supplies, and no home care set up, although family was told that it had been. They were instructed to go to ED. Per family there have been no acute changes, but they have no way to provide care for their mother at home, no food, feeding pump, home services, training, etc. Unable to asses ROS due to pt non verbal.  		  In the ED, found to be HTN to 201/81. Admitted to for Hypertensive Urgency.    #HTN Urgency: Admission pt presented with hypertensive urgency of 201/81. Unclear if patient had been receiving BP meds. Restarted on home metoprolol 100mg BID, and lisinopril 20mg. Lisinopril increased to 40mg QD and hydralazine added at dose 50mg q8H. BP stabilized to ABP 140s 150s. Monitored on telemetry without event. HbA1c  = 5.7.   -If HTN not controlled to <150/90, then consider increasing hydralazine to 100mg q8H.    #Cerebrovascular accident: pt has hx of CVA with R residual hemiparesis and recent CVA x 2. Pt is bed bound and non verbal since. Continued on ASA and plavix. Patient continued on PEG feedings. Patient found to be at neurologic baseline.  -C/w ASA, Plavix, and PEG feeding.    #Anemia: pt presented with microcytic anemia. H/H 9.8/31.3. MCV 75.2. Reticulocyte% 3.3. Absolute Reticulocyte 127.5. Started on IV Venofer.  Fecal Occult -ve.  Haptologbin  370. Vitamin B12  475. Fe 61.TIBC  225. Fe Sat 27. . Ferrtin 413.  -Monitor CBC  -C/w IV Venofer.    #DM: pt was taking metformin 1000mg BID and Glimepiride 2mg a day. It was held during admission and continued on sliding scale insulin. HbA1C.  = 5.7. Finger stick BG was upto 160. Upon discharge, patient to take 500mg Metformin BID and additional glimiperide PO can be considered if needed. Given HbA1c 5.7, was likely having hypoglycemia previously. Goal     #Vit D Deficiency:  19.9. Patient started on Vitamin D repletion with 1000U QD.  -PO repletion 1000 U QD  -Check repeat Vitamin D level in 4-6 weeks.    Per PT, patient to got to [facility].    Discussed with medical attending Dr. Reyez, patient medically stable for discharge to [facility]. 75 y/o F originally from [place] and bedbound and non verbal since. Pt has PMH of HTN, DM, HLD and CVA (with residual Right hemiparesis). Per family, patient had history of stroke in the past, and recently had 2 further strokes. In last admission in december, charged to rehab with feeding tube placed (due to decreased oral intake). Recently discharged from rehab. Family decided to move her to [facility] for better family support with multiple sons/daughters. Children picked her up from airport today and patient had no supplies, and no home care set up, although family was told that it had been. They were instructed to go to ED. Per family there have been no acute changes, but they have no way to provide care for their mother at home, no food, feeding pump, home services, training, etc. Unable to asses ROS due to pt non verbal.  		  In the ED, found to be HTN to 201/81. Admitted to for Hypertensive Urgency.    #HTN Urgency: Admission pt presented with hypertensive urgency of 201/81. Unclear if patient had been receiving BP meds. Restarted on home metoprolol 100mg BID, and lisinopril 20mg. Lisinopril increased to 40mg QD and hydralazine added at dose 50mg q8H. BP stabilized to ABP 140s 150s. Monitored on telemetry without event. HbA1c  = 5.7.   -If HTN not controlled to <150/90, then consider increasing hydralazine to 100mg q8H.    #Cerebrovascular accident: pt has hx of CVA with R residual hemiparesis and recent CVA x 2. Pt is bed bound and non verbal since. Continued on ASA and plavix. Patient continued on PEG feedings. Patient found to be at neurologic baseline.  -C/w ASA, Plavix, and PEG feeding.    #Anemia: pt presented with microcytic anemia. H/H 9.8/31.3. MCV 75.2. Reticulocyte% 3.3. Absolute Reticulocyte 127.5. Started on IV Venofer.  Fecal Occult -ve.  Haptologbin  370. Vitamin B12  475. Fe 61.TIBC  225. Fe Sat 27. . Ferrtin 413.  -Monitor CBC  -C/w IV Venofer.    #DM: pt was taking metformin 1000mg BID and Glimepiride 2mg a day. It was held during admission and continued on sliding scale insulin. HbA1C.  = 5.7. Finger stick BG was upto 160. Upon discharge, patient to take 500mg Metformin BID and additional glimiperide PO can be considered if needed. Given HbA1c 5.7, was likely having hypoglycemia previously. Goal HbA1c 7.0. Tube feeds changed to glucerna. 300cc TID Free H20 were given during the admission.    #Vit D Deficiency:  19.9. Patient started on Vitamin D repletion with 1000U QD.  -PO repletion 1000 U QD  -Check repeat Vitamin D level in 4-6 weeks.    Per PT, patient to got to [facility].    Discussed with medical attending Dr. Reyez, patient medically stable for discharge to [facility]. 75 y/o F originally from [place] and bedbound and non verbal since. Pt has PMH of HTN, DM, HLD and CVA (with residual Right hemiparesis). Per family, patient had history of stroke in the past, and recently had 2 further strokes. In last admission in december, charged to rehab with feeding tube placed (due to decreased oral intake). Recently discharged from rehab. Family decided to move her to [facility] for better family support with multiple sons/daughters. Children picked her up from airport today and patient had no supplies, and no home care set up, although family was told that it had been. They were instructed to go to ED. Per family there have been no acute changes, but they have no way to provide care for their mother at home, no food, feeding pump, home services, training, etc. Unable to asses ROS due to pt non verbal.  		  In the ED, found to be HTN to 201/81. Admitted to for Hypertensive Urgency.    #HTN Urgency: Admission pt presented with hypertensive urgency of 201/81. Unclear if patient had been receiving BP meds. Restarted on home metoprolol 100mg BID, and lisinopril 20mg. Lisinopril increased to 40mg QD and hydralazine added at dose 50mg q8H. BP stabilized to ABP 140s 150s. Monitored on telemetry without event. HbA1c  = 5.7.   -If HTN not controlled to <150/90, then consider increasing hydralazine to 100mg q8H.    #Cerebrovascular accident: pt has hx of CVA with R residual hemiparesis and recent CVA x 2. Pt is bed bound but able to follow simple commands and non verbal since. Continued on ASA and plavix. Patient continued on PEG feedings. Patient found to be at neurologic baseline.  -C/w ASA, Plavix, and PEG feeding.    #Anemia: pt presented with microcytic anemia. H/H 9.8/31.3. MCV 75.2. Reticulocyte% 3.3. Absolute Reticulocyte 127.5. Started on IV Venofer.  Fecal Occult negative.  Haptologbin  370. Vitamin B12  475. Fe 61.TIBC  225. Fe Sat 27. . Ferrtin 413.  -Monitor CBC  -C/w IV Venofer. Switch to oral iron on discharge    #DM: pt was taking metformin 1000mg BID and Glimepiride 2mg a day. It was held during admission and continued on sliding scale insulin. HbA1C.  = 5.7. Finger stick BG was upto 160. Upon discharge, patient to take 500mg Metformin BID and additional glimiperide PO can be considered if needed. Given HbA1c 5.7, was likely having hypoglycemia previously. Goal HbA1c 7.0. Tube feeds changed to glucerna. 300cc TID Free H20 were given during the admission.    #Vit D Deficiency:  19.9. Patient started on Vitamin D repletion with 1000U QD.  -PO repletion 1000 U QD  -Check repeat Vitamin D level in 4-6 weeks.    Patient seen by PT. recommended short term rehab which was arranged by  in discussion with  and .     Discussed with medical attending Dr. Reyez, patient medically stable for discharge to [facility].

## 2017-01-08 NOTE — DISCHARGE NOTE ADULT - PATIENT PORTAL LINK FT
“You can access the [employer] Patient Portal, offered by [facility], by registering with the following website: [email or web address]”

## 2017-01-08 NOTE — PHYSICAL THERAPY INITIAL EVALUATION ADULT - MANUAL MUSCLE TESTING RESULTS, REHAB EVAL
Left UE and Left LE 3/5 grossly throughout.  Right LE 2+/5 grossly throughout.  Right UE 0/5 throughout

## 2017-01-08 NOTE — DISCHARGE NOTE ADULT - NS AS DC STROKE ED MATERIALS
Stroke Education Booklet/Stroke Warning Signs and Symptoms/Call 911 for Stroke/Risk Factors for Stroke/Prescribed Medications/Need for Followup After Discharge

## 2017-01-08 NOTE — DISCHARGE NOTE ADULT - PLAN OF CARE
SBP < 150/90 Continue metoprolol 100mg BID, Lisinopril 40mg QD, and Hydralazine 50mg PO Q8H. If SBP > 150/90 then consider increasing hydralazine to 100mg PO q8h. avoid recurrence Continue ASA and Plavix LDL < 70 Continue lipitor 80mg QD feed adequately continue with tube feeding HbA1c < 7 Continue home dose metformin 1000mg BID and glimiperide 2mg QD Hgb > 8 Monitor CBC. Continue with Fe Supplementation with IV Venofer. Vitamin D level ~ 50 Continue with oral Vitamin D 1000U QD. Follow-up with Vitamin D level re-check within 4-6 weeks. continue with tube feeding with Glucerna. Patient was previously getting Jevity. HbA1c 6.5 - 7.5 HbA1c 5.7 during this admission. Likely having hypoglycemic episodes in previous regimen. Changed to regimen of 500mg Metformin BID. Consider adding back previous glimiperide 2mg QD if needed. Tube feeds were changed from previous Jevity to Glucerna during this admission. continue with tube feeding with Glucerna. Patient was previously getting Jevity. Patient received Free H20 300ml TID during admission. Monitor Na upon discharge. HbA1c 7.0 Continue metoprolol 100mg BID, Lisinopril 40mg QD, and Hydralazine 50mg PO thrice daily. If BP > 150/90 then consider increasing hydralazine to 100mg PO thrice daily. Continue Plavix. Continue lipitor 80mg daily s/p CVA Hgb > 9gm; Monitor CBC. Continue with Fe Supplementation. Given Venofer; Stool guaiac was negative for Occult blood. HbA1c 5.7 during this admission. Likely having hypoglycemic episodes in previous regimen. Changed to regimen of 500mg Metformin BID. Increase to 1000 mg twice daily if sugars more than 150.   Consider adding back previous glimiperide only  if needed and sugars more than 150 persistently.    Tube feeds were changed from previous Jevity to Glucerna during this admission. continue with tube feeding with Glucerna. Patient was previously getting Jevity. Patient received Free H20 300ml TID during admission. Monitor Na upon discharge.  Continue free water 300 ml thrice daily with feeds.  Please continue Glucerna 75ml/ hr for 16 hrs or equivalent amount in 24 hrs.

## 2017-01-08 NOTE — DISCHARGE NOTE ADULT - MEDICATION SUMMARY - MEDICATIONS TO STOP TAKING
I will STOP taking the medications listed below when I get home from the hospital:  None I will STOP taking the medications listed below when I get home from the hospital:    glimepiride 2 mg oral tablet  -- 1 tab(s) by mouth once a day

## 2017-01-08 NOTE — DISCHARGE NOTE ADULT - SECONDARY DIAGNOSIS.
Cerebrovascular accident (CVA), unspecified mechanism Hyperlipidemia, unspecified hyperlipidemia type PEG (percutaneous endoscopic gastrostomy) status Type 2 diabetes mellitus without complication, without long-term current use of insulin Anemia, unspecified type Vitamin D deficiency

## 2017-01-08 NOTE — PHYSICAL THERAPY INITIAL EVALUATION ADULT - GENERAL OBSERVATIONS, REHAB EVAL
Patient received supine in bed, +Peg Tube, NAD. Non verbal.  Son contacted via phone to attain history

## 2017-01-08 NOTE — DISCHARGE NOTE ADULT - MEDICATION SUMMARY - MEDICATIONS TO CHANGE
I will SWITCH the dose or number of times a day I take the medications listed below when I get home from the hospital:    lisinopril 20 mg oral tablet  -- 1 tab(s) by mouth once a day I will SWITCH the dose or number of times a day I take the medications listed below when I get home from the hospital:    metFORMIN 1000 mg oral tablet  -- 1 tab(s) by mouth 2 times a day    lisinopril 20 mg oral tablet  -- 1 tab(s) by mouth once a day

## 2017-01-08 NOTE — PHYSICAL THERAPY INITIAL EVALUATION ADULT - BED MOBILITY LIMITATIONS, REHAB EVAL
impaired ability to control trunk for mobility/decreased ability to use legs for bridging/pushing/decreased ability to use arms for pushing/pulling

## 2017-01-09 VITALS
OXYGEN SATURATION: 100 % | DIASTOLIC BLOOD PRESSURE: 68 MMHG | RESPIRATION RATE: 18 BRPM | HEART RATE: 83 BPM | SYSTOLIC BLOOD PRESSURE: 153 MMHG | TEMPERATURE: 98 F

## 2017-01-09 LAB
ANION GAP SERPL CALC-SCNC: 9 MMOL/L — SIGNIFICANT CHANGE UP (ref 5–17)
BASOPHILS # BLD AUTO: 0 K/UL — SIGNIFICANT CHANGE UP (ref 0–0.2)
BASOPHILS NFR BLD AUTO: 0.5 % — SIGNIFICANT CHANGE UP (ref 0–2)
BUN SERPL-MCNC: 27 MG/DL — HIGH (ref 7–18)
CALCIUM SERPL-MCNC: 8.8 MG/DL — SIGNIFICANT CHANGE UP (ref 8.4–10.5)
CHLORIDE SERPL-SCNC: 108 MMOL/L — SIGNIFICANT CHANGE UP (ref 96–108)
CO2 SERPL-SCNC: 26 MMOL/L — SIGNIFICANT CHANGE UP (ref 22–31)
CREAT SERPL-MCNC: 1.26 MG/DL — SIGNIFICANT CHANGE UP (ref 0.5–1.3)
EOSINOPHIL # BLD AUTO: 0 K/UL — SIGNIFICANT CHANGE UP (ref 0–0.5)
EOSINOPHIL NFR BLD AUTO: 0.7 % — SIGNIFICANT CHANGE UP (ref 0–6)
GLUCOSE SERPL-MCNC: 113 MG/DL — HIGH (ref 70–99)
HCT VFR BLD CALC: 28.6 % — LOW (ref 34.5–45)
HGB BLD-MCNC: 8.9 G/DL — LOW (ref 11.5–15.5)
LYMPHOCYTES # BLD AUTO: 0.8 K/UL — LOW (ref 1–3.3)
LYMPHOCYTES # BLD AUTO: 12.8 % — LOW (ref 13–44)
MAGNESIUM SERPL-MCNC: 2 MG/DL — SIGNIFICANT CHANGE UP (ref 1.8–2.4)
MCHC RBC-ENTMCNC: 23.4 PG — LOW (ref 27–34)
MCHC RBC-ENTMCNC: 31.2 GM/DL — LOW (ref 32–36)
MCV RBC AUTO: 74.9 FL — LOW (ref 80–100)
MONOCYTES # BLD AUTO: 0.6 K/UL — SIGNIFICANT CHANGE UP (ref 0–0.9)
MONOCYTES NFR BLD AUTO: 8.8 % — SIGNIFICANT CHANGE UP (ref 2–14)
NEUTROPHILS # BLD AUTO: 4.9 K/UL — SIGNIFICANT CHANGE UP (ref 1.8–7.4)
NEUTROPHILS NFR BLD AUTO: 77.1 % — HIGH (ref 43–77)
OB PNL STL: NEGATIVE — SIGNIFICANT CHANGE UP
PHOSPHATE SERPL-MCNC: 2.9 MG/DL — SIGNIFICANT CHANGE UP (ref 2.5–4.5)
PLATELET # BLD AUTO: 281 K/UL — SIGNIFICANT CHANGE UP (ref 150–400)
POTASSIUM SERPL-MCNC: 3.3 MMOL/L — LOW (ref 3.5–5.3)
POTASSIUM SERPL-SCNC: 3.3 MMOL/L — LOW (ref 3.5–5.3)
RBC # BLD: 3.81 M/UL — SIGNIFICANT CHANGE UP (ref 3.8–5.2)
RBC # FLD: 18.7 % — HIGH (ref 10.3–14.5)
SODIUM SERPL-SCNC: 143 MMOL/L — SIGNIFICANT CHANGE UP (ref 135–145)
WBC # BLD: 6.3 K/UL — SIGNIFICANT CHANGE UP (ref 3.8–10.5)
WBC # FLD AUTO: 6.3 K/UL — SIGNIFICANT CHANGE UP (ref 3.8–10.5)

## 2017-01-09 PROCEDURE — 82550 ASSAY OF CK (CPK): CPT

## 2017-01-09 PROCEDURE — 85027 COMPLETE CBC AUTOMATED: CPT

## 2017-01-09 PROCEDURE — 84484 ASSAY OF TROPONIN QUANT: CPT

## 2017-01-09 PROCEDURE — 82652 VIT D 1 25-DIHYDROXY: CPT

## 2017-01-09 PROCEDURE — 83036 HEMOGLOBIN GLYCOSYLATED A1C: CPT

## 2017-01-09 PROCEDURE — 99285 EMERGENCY DEPT VISIT HI MDM: CPT | Mod: 25

## 2017-01-09 PROCEDURE — 82553 CREATINE MB FRACTION: CPT

## 2017-01-09 PROCEDURE — 82607 VITAMIN B-12: CPT

## 2017-01-09 PROCEDURE — 83010 ASSAY OF HAPTOGLOBIN QUANT: CPT

## 2017-01-09 PROCEDURE — 84443 ASSAY THYROID STIM HORMONE: CPT

## 2017-01-09 PROCEDURE — 82746 ASSAY OF FOLIC ACID SERUM: CPT

## 2017-01-09 PROCEDURE — 80048 BASIC METABOLIC PNL TOTAL CA: CPT

## 2017-01-09 PROCEDURE — 82728 ASSAY OF FERRITIN: CPT

## 2017-01-09 PROCEDURE — 84100 ASSAY OF PHOSPHORUS: CPT

## 2017-01-09 PROCEDURE — 82306 VITAMIN D 25 HYDROXY: CPT

## 2017-01-09 PROCEDURE — 80053 COMPREHEN METABOLIC PANEL: CPT

## 2017-01-09 PROCEDURE — 83735 ASSAY OF MAGNESIUM: CPT

## 2017-01-09 PROCEDURE — 83550 IRON BINDING TEST: CPT

## 2017-01-09 PROCEDURE — 82272 OCCULT BLD FECES 1-3 TESTS: CPT

## 2017-01-09 PROCEDURE — 93005 ELECTROCARDIOGRAM TRACING: CPT

## 2017-01-09 PROCEDURE — 99239 HOSP IP/OBS DSCHRG MGMT >30: CPT

## 2017-01-09 PROCEDURE — 85045 AUTOMATED RETICULOCYTE COUNT: CPT

## 2017-01-09 PROCEDURE — 83615 LACTATE (LD) (LDH) ENZYME: CPT

## 2017-01-09 PROCEDURE — 97110 THERAPEUTIC EXERCISES: CPT

## 2017-01-09 RX ORDER — SENNA PLUS 8.6 MG/1
2 TABLET ORAL
Qty: 0 | Refills: 0 | COMMUNITY
Start: 2017-01-09

## 2017-01-09 RX ORDER — BACLOFEN 100 %
1 POWDER (GRAM) MISCELLANEOUS
Qty: 0 | Refills: 0 | COMMUNITY
Start: 2017-01-09

## 2017-01-09 RX ORDER — LISINOPRIL 2.5 MG/1
1 TABLET ORAL
Qty: 0 | Refills: 0 | COMMUNITY

## 2017-01-09 RX ORDER — CLOPIDOGREL BISULFATE 75 MG/1
1 TABLET, FILM COATED ORAL
Qty: 0 | Refills: 0 | COMMUNITY
Start: 2017-01-09

## 2017-01-09 RX ORDER — METOPROLOL TARTRATE 50 MG
1 TABLET ORAL
Qty: 0 | Refills: 0 | COMMUNITY

## 2017-01-09 RX ORDER — DOCUSATE SODIUM 100 MG
1 CAPSULE ORAL
Qty: 0 | Refills: 0 | COMMUNITY
Start: 2017-01-09

## 2017-01-09 RX ORDER — BACLOFEN 100 %
1 POWDER (GRAM) MISCELLANEOUS
Qty: 0 | Refills: 0 | COMMUNITY

## 2017-01-09 RX ORDER — POTASSIUM CHLORIDE 20 MEQ
40 PACKET (EA) ORAL ONCE
Qty: 0 | Refills: 0 | Status: COMPLETED | OUTPATIENT
Start: 2017-01-09 | End: 2017-01-09

## 2017-01-09 RX ORDER — CLOPIDOGREL BISULFATE 75 MG/1
1 TABLET, FILM COATED ORAL
Qty: 0 | Refills: 0 | COMMUNITY

## 2017-01-09 RX ORDER — METFORMIN HYDROCHLORIDE 850 MG/1
1 TABLET ORAL
Qty: 0 | Refills: 0 | COMMUNITY

## 2017-01-09 RX ORDER — GLIMEPIRIDE 1 MG
1 TABLET ORAL
Qty: 0 | Refills: 0 | COMMUNITY

## 2017-01-09 RX ORDER — CHOLECALCIFEROL (VITAMIN D3) 125 MCG
1000 CAPSULE ORAL
Qty: 0 | Refills: 0 | COMMUNITY
Start: 2017-01-09

## 2017-01-09 RX ORDER — IRON SUCROSE 20 MG/ML
5 INJECTION, SOLUTION INTRAVENOUS
Qty: 0 | Refills: 0 | COMMUNITY
Start: 2017-01-09

## 2017-01-09 RX ORDER — LISINOPRIL 2.5 MG/1
1 TABLET ORAL
Qty: 0 | Refills: 0 | COMMUNITY
Start: 2017-01-09

## 2017-01-09 RX ORDER — METOPROLOL TARTRATE 50 MG
1 TABLET ORAL
Qty: 0 | Refills: 0 | COMMUNITY
Start: 2017-01-09

## 2017-01-09 RX ORDER — ATORVASTATIN CALCIUM 80 MG/1
1 TABLET, FILM COATED ORAL
Qty: 0 | Refills: 0 | COMMUNITY

## 2017-01-09 RX ORDER — ATORVASTATIN CALCIUM 80 MG/1
1 TABLET, FILM COATED ORAL
Qty: 0 | Refills: 0 | COMMUNITY
Start: 2017-01-09

## 2017-01-09 RX ORDER — FERROUS SULFATE 325(65) MG
5 TABLET ORAL
Qty: 0 | Refills: 0 | COMMUNITY

## 2017-01-09 RX ORDER — HYDRALAZINE HCL 50 MG
1 TABLET ORAL
Qty: 0 | Refills: 0 | COMMUNITY
Start: 2017-01-09

## 2017-01-09 RX ADMIN — ENOXAPARIN SODIUM 40 MILLIGRAM(S): 100 INJECTION SUBCUTANEOUS at 11:37

## 2017-01-09 RX ADMIN — Medication 1000 UNIT(S): at 11:37

## 2017-01-09 RX ADMIN — IRON SUCROSE 210 MILLIGRAM(S): 20 INJECTION, SOLUTION INTRAVENOUS at 11:37

## 2017-01-09 RX ADMIN — Medication 50 MILLIGRAM(S): at 14:25

## 2017-01-09 RX ADMIN — CLOPIDOGREL BISULFATE 75 MILLIGRAM(S): 75 TABLET, FILM COATED ORAL at 11:37

## 2017-01-09 RX ADMIN — Medication 50 MILLIGRAM(S): at 06:20

## 2017-01-09 RX ADMIN — Medication 40 MILLIEQUIVALENT(S): at 14:25

## 2017-01-09 RX ADMIN — Medication 1: at 12:22

## 2017-01-09 RX ADMIN — LISINOPRIL 40 MILLIGRAM(S): 2.5 TABLET ORAL at 06:20

## 2017-01-09 RX ADMIN — Medication 100 MILLIGRAM(S): at 06:20

## 2017-01-13 DIAGNOSIS — I69.351 HEMIPLEGIA AND HEMIPARESIS FOLLOWING CEREBRAL INFARCTION AFFECTING RIGHT DOMINANT SIDE: ICD-10-CM

## 2017-01-13 DIAGNOSIS — Z93.1 GASTROSTOMY STATUS: ICD-10-CM

## 2017-01-13 DIAGNOSIS — E55.9 VITAMIN D DEFICIENCY, UNSPECIFIED: ICD-10-CM

## 2017-01-13 DIAGNOSIS — I10 ESSENTIAL (PRIMARY) HYPERTENSION: ICD-10-CM

## 2017-01-13 DIAGNOSIS — E78.5 HYPERLIPIDEMIA, UNSPECIFIED: ICD-10-CM

## 2017-01-13 DIAGNOSIS — E43 UNSPECIFIED SEVERE PROTEIN-CALORIE MALNUTRITION: ICD-10-CM

## 2017-01-13 DIAGNOSIS — D64.9 ANEMIA, UNSPECIFIED: ICD-10-CM

## 2017-01-13 DIAGNOSIS — Z74.8 OTHER PROBLEMS RELATED TO CARE PROVIDER DEPENDENCY: ICD-10-CM

## 2017-01-13 DIAGNOSIS — E11.9 TYPE 2 DIABETES MELLITUS WITHOUT COMPLICATIONS: ICD-10-CM

## 2017-01-13 DIAGNOSIS — Z79.84 LONG TERM (CURRENT) USE OF ORAL HYPOGLYCEMIC DRUGS: ICD-10-CM

## 2017-01-13 DIAGNOSIS — Z74.01 BED CONFINEMENT STATUS: ICD-10-CM

## 2017-01-13 DIAGNOSIS — R13.10 DYSPHAGIA, UNSPECIFIED: ICD-10-CM

## 2017-01-13 DIAGNOSIS — I16.0 HYPERTENSIVE URGENCY: ICD-10-CM

## 2017-06-03 NOTE — ED PROVIDER NOTE - CPE EDP CARDIAC NORM
normal... PAST MEDICAL/SURGICAL/SOCIAL HISTORY/REVIEW OF SYSTEMS/RESULTS/PHYSICAL EXAM/DISPOSITION/VITAL SIGNS( Pullset)/HISTORY OF PRESENT ILLNESS/PROGRESS NOTE

## 2018-09-06 NOTE — DIETITIAN INITIAL EVALUATION ADULT. - PROBLEM SELECTOR PLAN 1
On admission pt presented with hypertensive urgency of 201/81  pt did not get medications in AM  will resume medications   c/w telemetry monitoring for 24hrs until BP improves   f/u TSH, lipid profile and HbA1C For information on Fall & Injury Prevention, visit [email or web address]

## 2024-10-24 NOTE — ED PROVIDER NOTE - CARE PLAN
Principal Discharge DX:	CVA (cerebral vascular accident) danger to self; mental illness expected to respond to inpatient care danger to self; mental illness expected to respond to inpatient care

## 2024-10-31 NOTE — H&P ADULT. - PSH
----- Message from Donte Suero MD sent at 10/28/2024  8:57 AM CDT -----  CT scan of the liver showed liver cirrhosis with no liver lesions/spots.     Same recommendations and plan for follow up.    PEG (percutaneous endoscopic gastrostomy) status
